# Patient Record
Sex: MALE | Race: WHITE | NOT HISPANIC OR LATINO | Employment: OTHER | URBAN - METROPOLITAN AREA
[De-identification: names, ages, dates, MRNs, and addresses within clinical notes are randomized per-mention and may not be internally consistent; named-entity substitution may affect disease eponyms.]

---

## 2023-06-05 ENCOUNTER — HOSPITAL ENCOUNTER (OUTPATIENT)
Dept: DATA CONVERSION | Facility: HOSPITAL | Age: 73
End: 2023-06-05
Attending: OPHTHALMOLOGY | Admitting: OPHTHALMOLOGY
Payer: MEDICARE

## 2023-06-05 DIAGNOSIS — H49.12 FOURTH (TROCHLEAR) NERVE PALSY, LEFT EYE: ICD-10-CM

## 2023-06-05 DIAGNOSIS — H50.22 VERTICAL STRABISMUS, LEFT EYE: ICD-10-CM

## 2023-06-05 DIAGNOSIS — I10 ESSENTIAL (PRIMARY) HYPERTENSION: ICD-10-CM

## 2023-06-05 DIAGNOSIS — E11.9 TYPE 2 DIABETES MELLITUS WITHOUT COMPLICATIONS (MULTI): ICD-10-CM

## 2023-06-05 DIAGNOSIS — G47.33 OBSTRUCTIVE SLEEP APNEA (ADULT) (PEDIATRIC): ICD-10-CM

## 2023-06-05 DIAGNOSIS — Z86.73 PERSONAL HISTORY OF TRANSIENT ISCHEMIC ATTACK (TIA), AND CEREBRAL INFARCTION WITHOUT RESIDUAL DEFICITS: ICD-10-CM

## 2023-06-05 LAB — POCT GLUCOSE: 123 MG/DL (ref 74–99)

## 2023-09-07 VITALS
RESPIRATION RATE: 16 BRPM | DIASTOLIC BLOOD PRESSURE: 72 MMHG | SYSTOLIC BLOOD PRESSURE: 141 MMHG | HEART RATE: 78 BPM | TEMPERATURE: 97.2 F

## 2023-09-28 PROBLEM — H90.A22 SENSORINEURAL HEARING LOSS (SNHL) OF LEFT EAR WITH RESTRICTED HEARING OF RIGHT EAR: Status: ACTIVE | Noted: 2023-09-28

## 2023-09-28 PROBLEM — H69.91 DYSFUNCTION OF RIGHT EUSTACHIAN TUBE: Status: ACTIVE | Noted: 2023-09-28

## 2023-09-28 PROBLEM — M26.629 TMJ SYNDROME: Status: ACTIVE | Noted: 2023-09-28

## 2023-09-28 PROBLEM — H49.12: Status: ACTIVE | Noted: 2023-09-28

## 2023-09-28 PROBLEM — H90.A31 MIXED CONDUCTIVE AND SENSORINEURAL HEARING LOSS OF RIGHT EAR WITH RESTRICTED HEARING OF LEFT EAR: Status: ACTIVE | Noted: 2023-09-28

## 2023-09-28 PROBLEM — H66.91 RIGHT OTITIS MEDIA: Status: ACTIVE | Noted: 2023-09-28

## 2023-09-28 RX ORDER — ATORVASTATIN CALCIUM 80 MG/1
80 TABLET, FILM COATED ORAL DAILY
COMMUNITY
Start: 2022-11-04

## 2023-09-28 RX ORDER — METFORMIN HYDROCHLORIDE 500 MG/1
1000 TABLET ORAL
COMMUNITY

## 2023-09-28 RX ORDER — BLOOD SUGAR DIAGNOSTIC
STRIP MISCELLANEOUS DAILY PRN
COMMUNITY
Start: 2023-01-19

## 2023-09-28 RX ORDER — GLIPIZIDE 5 MG/1
TABLET ORAL
COMMUNITY
End: 2024-01-30 | Stop reason: WASHOUT

## 2023-09-28 RX ORDER — LORAZEPAM 1 MG/1
1 TABLET ORAL 2 TIMES DAILY PRN
COMMUNITY
Start: 2023-06-01

## 2023-09-28 RX ORDER — BROMPHENIRAMINE MALEATE, PSEUDOEPHEDRINE HYDROCHLORIDE, AND DEXTROMETHORPHAN HYDROBROMIDE 2; 30; 10 MG/5ML; MG/5ML; MG/5ML
10 SYRUP ORAL
Status: ON HOLD | COMMUNITY
Start: 2022-10-28 | End: 2023-11-21 | Stop reason: ALTCHOICE

## 2023-09-28 RX ORDER — AMANTADINE HYDROCHLORIDE 100 MG/1
100 CAPSULE, GELATIN COATED ORAL DAILY
Status: ON HOLD | COMMUNITY
Start: 2022-10-31 | End: 2023-11-21 | Stop reason: ALTCHOICE

## 2023-09-28 RX ORDER — FLUOXETINE HYDROCHLORIDE 20 MG/1
CAPSULE ORAL
Status: ON HOLD | COMMUNITY
End: 2023-11-21 | Stop reason: ALTCHOICE

## 2023-09-28 RX ORDER — VENLAFAXINE HYDROCHLORIDE 37.5 MG/1
37.5 CAPSULE, EXTENDED RELEASE ORAL DAILY
Status: ON HOLD | COMMUNITY
Start: 2022-12-12 | End: 2023-11-21

## 2023-09-28 RX ORDER — CYCLOBENZAPRINE HCL 10 MG
TABLET ORAL 2 TIMES DAILY PRN
Status: ON HOLD | COMMUNITY
Start: 2022-10-01 | End: 2023-11-21 | Stop reason: ALTCHOICE

## 2023-09-28 RX ORDER — VENLAFAXINE HYDROCHLORIDE 75 MG/1
75 CAPSULE, EXTENDED RELEASE ORAL NIGHTLY
COMMUNITY
Start: 2023-04-07

## 2023-09-28 RX ORDER — DONEPEZIL HYDROCHLORIDE 5 MG/1
5 TABLET, FILM COATED ORAL DAILY
COMMUNITY
Start: 2023-04-24

## 2023-09-28 RX ORDER — OMEPRAZOLE 40 MG/1
40 CAPSULE, DELAYED RELEASE ORAL DAILY
COMMUNITY
Start: 2022-12-27

## 2023-09-28 RX ORDER — LISINOPRIL 10 MG/1
10 TABLET ORAL DAILY
COMMUNITY
Start: 2023-04-13

## 2023-09-28 RX ORDER — BUSPIRONE HYDROCHLORIDE 15 MG/1
15 TABLET ORAL 3 TIMES DAILY
COMMUNITY
Start: 2023-04-17

## 2023-09-28 RX ORDER — GLIMEPIRIDE 2 MG/1
2 TABLET ORAL
COMMUNITY
Start: 2023-03-03

## 2023-09-28 RX ORDER — GABAPENTIN 300 MG/1
600 CAPSULE ORAL 3 TIMES DAILY
COMMUNITY
Start: 2023-05-14

## 2023-09-28 RX ORDER — OFLOXACIN 3 MG/ML
5 SOLUTION AURICULAR (OTIC) 2 TIMES DAILY
Status: ON HOLD | COMMUNITY
Start: 2020-03-12 | End: 2023-11-21 | Stop reason: ALTCHOICE

## 2023-09-30 NOTE — H&P
History of Present Illness:   History Present Illness:  Reason for surgery: left hypertropia, left superior  oblique palsy   HPI:    This is a 73 year old M with left hypertropia here for eye muscle surgery in the left eye    Allergies:        Allergies:  ·  No Known Allergies :     Home Medication Review:   Home Medications Reviewed: yes     Impression/Procedure:   ·  Impression and Planned Procedure: eye muscle surgery in the left eye       ERAS (Enhanced Recovery After Surgery):  ·  ERAS Patient: no       Vital Signs:  Temperature C: 36.2 degrees C   Temperature F: 97.1 degrees F   Heart Rate: 78 beats per minute   Respiratory Rate: 16 breath per minute   Blood Pressure Systolic: 141 mm/Hg   Blood Pressure Diastolic: 72 mm/Hg     Physical Exam by System:    Constitutional: Well developed, awake/alert/oriented  x3, no distress, alert and cooperative   Eyes: left hypertropia   Respiratory/Thorax: as per anesthesia   Cardiovascular: as per anesthesia     Consent:   COVID-19 Consent:  ·  COVID-19 Risk Consent Surgeon has reviewed key risks related to the risk of eladio COVID-19 and if they contract COVID-19 what the risks are.     Attestation:   Note Completion:  I am a:  Resident/Fellow   Attending Attestation I saw and evaluated the patient.  I personally obtained the key and critical portions of the history and physical exam or was physically present for key and  critical portions performed by the resident/fellow. I reviewed the resident/fellow?s documentation and discussed the patient with the resident/fellow.  I agree with the resident/fellow?s medical decision making as documented in the note.     I personally evaluated the patient on 05-Jun-2023         Electronic Signatures:  Shane Cruz)  (Signed 05-Jun-2023 13:43)   Authored: History of Present Illness, Physical Exam,  Note Completion   Co-Signer: History of Present Illness, Allergies, Home Medication Review, Impression/Procedure, ERAS,  Physical Exam, Consent, Note Completion  Raul Higgins (Fellow))  (Signed 05-Jun-2023 11:58)   Authored: History of Present Illness, Allergies, Home  Medication Review, Impression/Procedure, ERAS, Physical Exam, Consent, Note Completion      Last Updated: 05-Jun-2023 13:43 by Shane Cruz)

## 2023-10-02 NOTE — OP NOTE
Post Operative Note:     PreOp Diagnosis: left hypertropia, left 4th NP   Post-Procedure Diagnosis: Same   Procedure: 1. left inferior oblique myectomy   Surgeon: Shane Cruz MD   Resident/Fellow/Other Assistant: Raul Higgins MD   Anesthesia: general   I.V. Fluids: 0   Estimated Blood Loss (mL): 0   Blood Replacement: 0   Specimen: no   Complications: none   Findings: normal anatomy and ductions     Operative Report Dictated:  Dictation: not applicable - note contains Operative  Report   Operative Report:    The patient was brought to the operating room and was placed in a supine position. After the patient was positively identified through a typical time-out procedure,  the patient received anesthesia and an LMA. Then the left eye was prepped and draped in the usual sterile ophthalmic fashion. Attention was then directed to the left eye in which an inferotemporal fornix conjunctival incision was performed. The lateral  rectus and inferior rectus muscles were identified and hooked. Using these hooks, the eye was turned superonasally. The inferior oblique was then identified and freed from the soft surrounding tissues without disrupting the orbital septum or the vortex  vein. The muscle was clamped at the level of the insertion to the globe and at the level of the intermuscular septum. The muscle was cauterized using handheld high temperature cautery. The muscle was then cut at the level of cauterization, and the clamps  were removed. The conjunctiva was then closed with 2 interrupted 8-0 vicryl sutures in a buried fashion. At the end, both eyes were cleaned. Tetracaine and 5% betadine eye solution and maxitrol drops were instilled in the eyes. The patient was then awakened  and the LMA was removed. The patient was transferred to the recovery room in good and stable condition. The patient tolerated the procedure and the anesthesia well.     Attestation:   Note Completion:  I am a: Resident/Fellow   Attending  Attestation I was present for the entire procedure          Electronic Signatures:  Shane Cruz (MD)  (Signed 05-Jun-2023 13:46)   Authored: Post Operative Note, Note Completion   Co-Signer: Post Operative Note, Note Completion  Raul Higgins (Fellow))  (Signed 05-Jun-2023 13:10)   Authored: Post Operative Note, Note Completion      Last Updated: 05-Jun-2023 13:46 by Shane Cruz)

## 2023-10-06 ENCOUNTER — APPOINTMENT (OUTPATIENT)
Dept: OPHTHALMOLOGY | Facility: CLINIC | Age: 73
End: 2023-10-06
Payer: MEDICARE

## 2023-11-21 ENCOUNTER — ANESTHESIA EVENT (OUTPATIENT)
Dept: OPERATING ROOM | Facility: CLINIC | Age: 73
End: 2023-11-21
Payer: MEDICARE

## 2023-11-21 ENCOUNTER — HOSPITAL ENCOUNTER (OUTPATIENT)
Facility: CLINIC | Age: 73
Setting detail: OUTPATIENT SURGERY
Discharge: HOME | End: 2023-11-21
Attending: OPHTHALMOLOGY | Admitting: OPHTHALMOLOGY
Payer: MEDICARE

## 2023-11-21 ENCOUNTER — ANESTHESIA (OUTPATIENT)
Dept: OPERATING ROOM | Facility: CLINIC | Age: 73
End: 2023-11-21
Payer: MEDICARE

## 2023-11-21 VITALS
OXYGEN SATURATION: 97 % | HEIGHT: 67 IN | DIASTOLIC BLOOD PRESSURE: 62 MMHG | BODY MASS INDEX: 30.03 KG/M2 | RESPIRATION RATE: 20 BRPM | SYSTOLIC BLOOD PRESSURE: 150 MMHG | HEART RATE: 75 BPM | TEMPERATURE: 97.3 F | WEIGHT: 191.36 LBS

## 2023-11-21 LAB — POC FINGERSTICK BLOOD GLUCOSE: 147 MG/DL (ref 70–100)

## 2023-11-21 PROCEDURE — 2500000001 HC RX 250 WO HCPCS SELF ADMINISTERED DRUGS (ALT 637 FOR MEDICARE OP): Performed by: OPHTHALMOLOGY

## 2023-11-21 PROCEDURE — 67314 REVISE EYE MUSCLE: CPT | Performed by: OPHTHALMOLOGY

## 2023-11-21 PROCEDURE — 3600000008 HC OR TIME - EACH INCREMENTAL 1 MINUTE - PROCEDURE LEVEL THREE: Performed by: OPHTHALMOLOGY

## 2023-11-21 PROCEDURE — 7100000009 HC PHASE TWO TIME - INITIAL BASE CHARGE: Performed by: OPHTHALMOLOGY

## 2023-11-21 PROCEDURE — 3700000002 HC GENERAL ANESTHESIA TIME - EACH INCREMENTAL 1 MINUTE: Performed by: OPHTHALMOLOGY

## 2023-11-21 PROCEDURE — 7100000010 HC PHASE TWO TIME - EACH INCREMENTAL 1 MINUTE: Performed by: OPHTHALMOLOGY

## 2023-11-21 PROCEDURE — A4217 STERILE WATER/SALINE, 500 ML: HCPCS | Performed by: OPHTHALMOLOGY

## 2023-11-21 PROCEDURE — 2500000004 HC RX 250 GENERAL PHARMACY W/ HCPCS (ALT 636 FOR OP/ED): Performed by: ANESTHESIOLOGIST ASSISTANT

## 2023-11-21 PROCEDURE — 7100000002 HC RECOVERY ROOM TIME - EACH INCREMENTAL 1 MINUTE: Performed by: OPHTHALMOLOGY

## 2023-11-21 PROCEDURE — A67311 PR STABISMUS SURG,ONE HORIZ MUSCLE: Performed by: ANESTHESIOLOGY

## 2023-11-21 PROCEDURE — 2500000004 HC RX 250 GENERAL PHARMACY W/ HCPCS (ALT 636 FOR OP/ED): Performed by: OPHTHALMOLOGY

## 2023-11-21 PROCEDURE — 2500000005 HC RX 250 GENERAL PHARMACY W/O HCPCS: Performed by: ANESTHESIOLOGIST ASSISTANT

## 2023-11-21 PROCEDURE — 3600000003 HC OR TIME - INITIAL BASE CHARGE - PROCEDURE LEVEL THREE: Performed by: OPHTHALMOLOGY

## 2023-11-21 PROCEDURE — 7100000001 HC RECOVERY ROOM TIME - INITIAL BASE CHARGE: Performed by: OPHTHALMOLOGY

## 2023-11-21 PROCEDURE — 3700000001 HC GENERAL ANESTHESIA TIME - INITIAL BASE CHARGE: Performed by: OPHTHALMOLOGY

## 2023-11-21 PROCEDURE — 82962 GLUCOSE BLOOD TEST: CPT | Performed by: OPHTHALMOLOGY

## 2023-11-21 PROCEDURE — A67311 PR STABISMUS SURG,ONE HORIZ MUSCLE: Performed by: ANESTHESIOLOGIST ASSISTANT

## 2023-11-21 PROCEDURE — 99100 ANES PT EXTEME AGE<1 YR&>70: CPT | Performed by: ANESTHESIOLOGY

## 2023-11-21 RX ORDER — TAMSULOSIN HYDROCHLORIDE 0.4 MG/1
0.4 CAPSULE ORAL DAILY
COMMUNITY

## 2023-11-21 RX ORDER — PHENYLEPHRINE HYDROCHLORIDE 25 MG/ML
SOLUTION/ DROPS OPHTHALMIC AS NEEDED
Status: DISCONTINUED | OUTPATIENT
Start: 2023-11-21 | End: 2023-11-21 | Stop reason: HOSPADM

## 2023-11-21 RX ORDER — PROPOFOL 10 MG/ML
INJECTION, EMULSION INTRAVENOUS CONTINUOUS PRN
Status: DISCONTINUED | OUTPATIENT
Start: 2023-11-21 | End: 2023-11-21

## 2023-11-21 RX ORDER — WATER 1 ML/ML
IRRIGANT IRRIGATION AS NEEDED
Status: DISCONTINUED | OUTPATIENT
Start: 2023-11-21 | End: 2023-11-21 | Stop reason: HOSPADM

## 2023-11-21 RX ORDER — TETRACAINE HYDROCHLORIDE 5 MG/ML
SOLUTION OPHTHALMIC AS NEEDED
Status: DISCONTINUED | OUTPATIENT
Start: 2023-11-21 | End: 2023-11-21 | Stop reason: HOSPADM

## 2023-11-21 RX ORDER — GLYCOPYRROLATE 0.2 MG/ML
INJECTION INTRAMUSCULAR; INTRAVENOUS AS NEEDED
Status: DISCONTINUED | OUTPATIENT
Start: 2023-11-21 | End: 2023-11-21

## 2023-11-21 RX ORDER — ONDANSETRON HYDROCHLORIDE 2 MG/ML
4 INJECTION, SOLUTION INTRAVENOUS ONCE AS NEEDED
Status: DISCONTINUED | OUTPATIENT
Start: 2023-11-21 | End: 2023-11-21 | Stop reason: HOSPADM

## 2023-11-21 RX ORDER — ACETAMINOPHEN 325 MG/1
TABLET ORAL AS NEEDED
Status: DISCONTINUED | OUTPATIENT
Start: 2023-11-21 | End: 2023-11-21

## 2023-11-21 RX ORDER — ONDANSETRON HYDROCHLORIDE 2 MG/ML
INJECTION, SOLUTION INTRAVENOUS AS NEEDED
Status: DISCONTINUED | OUTPATIENT
Start: 2023-11-21 | End: 2023-11-21

## 2023-11-21 RX ORDER — LIDOCAINE HYDROCHLORIDE 20 MG/ML
INJECTION, SOLUTION INFILTRATION; PERINEURAL AS NEEDED
Status: DISCONTINUED | OUTPATIENT
Start: 2023-11-21 | End: 2023-11-21

## 2023-11-21 RX ORDER — FENTANYL CITRATE 50 UG/ML
INJECTION, SOLUTION INTRAMUSCULAR; INTRAVENOUS AS NEEDED
Status: DISCONTINUED | OUTPATIENT
Start: 2023-11-21 | End: 2023-11-21

## 2023-11-21 RX ORDER — PROPOFOL 10 MG/ML
INJECTION, EMULSION INTRAVENOUS AS NEEDED
Status: DISCONTINUED | OUTPATIENT
Start: 2023-11-21 | End: 2023-11-21

## 2023-11-21 RX ORDER — KETOROLAC TROMETHAMINE 30 MG/ML
INJECTION, SOLUTION INTRAMUSCULAR; INTRAVENOUS AS NEEDED
Status: DISCONTINUED | OUTPATIENT
Start: 2023-11-21 | End: 2023-11-21

## 2023-11-21 RX ORDER — SODIUM CHLORIDE, SODIUM LACTATE, POTASSIUM CHLORIDE, CALCIUM CHLORIDE 600; 310; 30; 20 MG/100ML; MG/100ML; MG/100ML; MG/100ML
100 INJECTION, SOLUTION INTRAVENOUS CONTINUOUS
Status: DISCONTINUED | OUTPATIENT
Start: 2023-11-21 | End: 2023-11-21 | Stop reason: HOSPADM

## 2023-11-21 RX ORDER — MIDAZOLAM HYDROCHLORIDE 1 MG/ML
INJECTION, SOLUTION INTRAMUSCULAR; INTRAVENOUS AS NEEDED
Status: DISCONTINUED | OUTPATIENT
Start: 2023-11-21 | End: 2023-11-21

## 2023-11-21 RX ORDER — NEOMYCIN SULFATE, POLYMYXIN B SULFATE AND DEXAMETHASONE 3.5; 10000; 1 MG/ML; [USP'U]/ML; MG/ML
SUSPENSION/ DROPS OPHTHALMIC AS NEEDED
Status: DISCONTINUED | OUTPATIENT
Start: 2023-11-21 | End: 2023-11-21 | Stop reason: HOSPADM

## 2023-11-21 RX ORDER — POVIDONE-IODINE 5 %
SOLUTION, NON-ORAL OPHTHALMIC (EYE) AS NEEDED
Status: DISCONTINUED | OUTPATIENT
Start: 2023-11-21 | End: 2023-11-21 | Stop reason: HOSPADM

## 2023-11-21 RX ORDER — LIDOCAINE IN NACL,ISO-OSMOT/PF 30 MG/3 ML
0.1 SYRINGE (ML) INJECTION ONCE
Status: DISCONTINUED | OUTPATIENT
Start: 2023-11-21 | End: 2023-11-21 | Stop reason: HOSPADM

## 2023-11-21 RX ADMIN — LIDOCAINE HYDROCHLORIDE 100 MG: 20 INJECTION, SOLUTION INFILTRATION; PERINEURAL at 12:14

## 2023-11-21 RX ADMIN — MIDAZOLAM 2 MG: 1 INJECTION INTRAMUSCULAR; INTRAVENOUS at 12:07

## 2023-11-21 RX ADMIN — PROPOFOL 50 MCG/KG/MIN: 10 INJECTION, EMULSION INTRAVENOUS at 12:19

## 2023-11-21 RX ADMIN — ONDANSETRON 4 MG: 2 INJECTION INTRAMUSCULAR; INTRAVENOUS at 12:49

## 2023-11-21 RX ADMIN — PROPOFOL 150 MG: 10 INJECTION, EMULSION INTRAVENOUS at 12:14

## 2023-11-21 RX ADMIN — SODIUM CHLORIDE, SODIUM LACTATE, POTASSIUM CHLORIDE, AND CALCIUM CHLORIDE: .6; .31; .03; .02 INJECTION, SOLUTION INTRAVENOUS at 11:45

## 2023-11-21 RX ADMIN — GLYCOPYRROLATE 0.1 MG: 0.2 INJECTION INTRAMUSCULAR; INTRAVENOUS at 12:07

## 2023-11-21 RX ADMIN — FENTANYL CITRATE 100 MCG: 50 INJECTION, SOLUTION INTRAMUSCULAR; INTRAVENOUS at 12:14

## 2023-11-21 RX ADMIN — ACETAMINOPHEN 975 MG: 325 TABLET ORAL at 12:00

## 2023-11-21 RX ADMIN — KETOROLAC TROMETHAMINE 30 MG: 30 INJECTION, SOLUTION INTRAMUSCULAR at 12:49

## 2023-11-21 SDOH — HEALTH STABILITY: MENTAL HEALTH: CURRENT SMOKER: 0

## 2023-11-21 ASSESSMENT — PAIN SCALES - GENERAL
PAINLEVEL_OUTOF10: 0 - NO PAIN
PAINLEVEL_OUTOF10: 1
PAINLEVEL_OUTOF10: 0 - NO PAIN
PAINLEVEL_OUTOF10: 0 - NO PAIN

## 2023-11-21 ASSESSMENT — COLUMBIA-SUICIDE SEVERITY RATING SCALE - C-SSRS
1. IN THE PAST MONTH, HAVE YOU WISHED YOU WERE DEAD OR WISHED YOU COULD GO TO SLEEP AND NOT WAKE UP?: NO
2. HAVE YOU ACTUALLY HAD ANY THOUGHTS OF KILLING YOURSELF?: NO
6. HAVE YOU EVER DONE ANYTHING, STARTED TO DO ANYTHING, OR PREPARED TO DO ANYTHING TO END YOUR LIFE?: NO

## 2023-11-21 ASSESSMENT — PAIN - FUNCTIONAL ASSESSMENT
PAIN_FUNCTIONAL_ASSESSMENT: 0-10

## 2023-11-21 NOTE — DISCHARGE INSTRUCTIONS
Postoperative Discharge Instructions  Eye Muscle Surgery (Strabismus surgery)    General Information  Bloody tears may ooze out of the eyes for 1 - 2 days. A small amount of bleeding from the nose may occur. You may wipe the eye carefully with facial tissue.  Don't be concerned about the position of the eyes immediately after surgery. It can take several weeks for the eye position to stabilize.  Discomfort or slight pain with eye movements as well as mild eye lid swelling can be seen and it tends to get better with time.  In the morning, the eyelids may be stuck together for the first day following surgery. Gently pull the lids apart. If this is too difficult, then apply a warm moist wash cloth for a few minutes to the area and try again.  Hair can be washed any time following surgery taking extra care to keep eyes dry at all times.    Activity  Don't drive for 48 hours after surgery or if you are experiencing double vision.  The anesthetic can affect coordination and judgment for 48 hours.  The patient may resume normal activities, including watching television as tolerated. Sports, exercise and swimming should be avoided for ten (10) days.  No swimming or pooled water for 2 weeks.   Showering is okay.     Diet  Avoid alcoholic beverages for 48 hours.  A normal diet may be resumed after a liquid diet is tolerated without nausea. Advance diet slowly with only bland (BRAT diet - bananas, rice, applesauce, toast) foods the first day.    Medications  You may take Tylenol or prescribed medication for discomfort. Avoid Aspirin and/or aspirin-related products, such as Advil and Motrin.  If eye drops are given or prescribed, please use them as directed. Maxitrol eye drops 4 times daily in the right eye.   Resume Home Medications.  Follow-up Appointment  1. Follow-up appointment as directed.  After Hours, Weekends and Holidays call [phone] and ask for Pediatric Ophthalmologist.      Please call if you have any  questions or problems - day or night.  [phone] or [phone]  After hours call [phone]    Tylenol/ibuprofen as needed.     May have Tylenol after: 6:00PM    May have Ibuprofen/advil/motrin/aleve after: 7:00PM

## 2023-11-21 NOTE — ANESTHESIA PREPROCEDURE EVALUATION
Patient: Tom Jhaveri    Procedure Information       Anesthesia Start Date/Time: 11/21/23 1207    Procedure: RIGHT INFERIOR RECTUS RECESSION (Right)    [place] [facility] [place] 02 / [facility]    Surgeons: Shane Cruz MD            Relevant Problems   Neuro/Psych   (+) Fourth (trochlear) nerve palsy, left eye      Eyes, Ears, Nose, and Throat   (+) Mixed conductive and sensorineural hearing loss of right ear with restricted hearing of left ear   (+) Sensorineural hearing loss (SNHL) of left ear with restricted hearing of right ear       Clinical information reviewed:   Tobacco  Allergies  Meds   Med Hx  Surg Hx   Fam Hx  Soc Hx        NPO Detail:  NPO/Void Status  Date of Last Liquid: 11/21/23  Time of Last Liquid: 0730  Date of Last Solid: 11/20/23  Time of Last Solid: 2000  Last Intake Type: Clear fluids         Physical Exam    Airway  Mallampati: III     Cardiovascular - normal exam     Dental    Pulmonary    Abdominal      Other findings: Possible difficult airway          Anesthesia Plan    ASA 3     general     The patient is not a current smoker.    intravenous induction   Anesthetic plan and risks discussed with patient and spouse.    Plan discussed with CAA.

## 2023-11-21 NOTE — ANESTHESIA PROCEDURE NOTES
Airway  Date/Time: 11/21/2023 12:16 PM  Urgency: elective    Airway not difficult    Staffing  Performed: LAURA   Authorized by: Jose Souza MD    Performed by: LAURA Hernandez  Patient location during procedure: OR    Indications and Patient Condition  Indications for airway management: anesthesia  Spontaneous ventilation: present  Sedation level: deep  Preoxygenated: yes  Mask difficulty assessment: 0 - not attempted  Planned trial extubation    Final Airway Details  Final airway type: supraglottic airway      Successful airway: Size 4     Number of attempts at approach: 1  Number of other approaches attempted: 0    Additional Comments  Lips/teeth in pre-anesthetic condition.  IGEL LMA

## 2023-11-21 NOTE — OP NOTE
RIGHT INFERIOR RECTUS RECESSION (R) Operative Note     Date: 2023  OR Location: [facility]    Name: Tom Jhaveri, : 1950, Age: 73 y.o., MRN: [id number], Sex: male    Diagnosis  Pre-op Diagnosis     * Fourth (trochlear) nerve palsy, unspecified eye [H49.10] Post-op Diagnosis     * Fourth (trochlear) nerve palsy, unspecified eye [H49.10]     Procedures  RIGHT INFERIOR RECTUS RECESSION  02083 - AL STRABISMUS RECESSION/RESCJ 1 [id number] [place]  RI 5.5 mm    Surgeons      * Shane Cruz - Primary    Resident/Fellow/Other Assistant:  Surgeon(s) and Role:  Pau Holley    Procedure Summary  Anesthesia: General  ASA: ASA status not filed in the log.  Anesthesia Staff: Anesthesiologist: Jose Souza MD  C-AA: LAURA Hernandez  Estimated Blood Loss: 1 mL  Intra-op Medications: * No intraprocedure medications in log *           Anesthesia Record               Intraprocedure I/O Totals          Intake    .00 mL    Propofol Drip 0.00 mL    The total shown is the total volume documented since Anesthesia Start was filed.    Total Intake 200 mL          Specimen: No specimens collected     Staff:   Circulator: Brandin Jauregui RN  Relief Circulator: Sanjuana Todd RN  Relief Scrub: Dottie Espinal RN  Scrub Person: Julienne Singer         Drains and/or Catheters: * None in log *    Tourniquet Times:         Implants:     Findings: Normal anatomy and ductions    Indications: Tom Jhaveri is an 73 y.o. male who is having surgery for Fourth (trochlear) nerve palsy, unspecified eye [H49.10].     The patient was seen in the preoperative area. The risks, benefits, complications, treatment options, non-operative alternatives, expected recovery and outcomes were discussed with the patient. The possibilities of reaction to medication, pulmonary aspiration, injury to surrounding structures, bleeding, recurrent infection, the need for additional procedures, failure to diagnose a  condition, and creating a complication requiring transfusion or operation were discussed with the patient. The patient concurred with the proposed plan, giving informed consent.  The site of surgery was properly noted/marked if necessary per policy. The patient has been actively warmed in preoperative area. Preoperative antibiotics are not indicated. Venous thrombosis prophylaxis are not indicated.    Procedure Details: The patient was brought to the operating room and was placed in a supine position.   After the patient was positively identified through a typical time-out procedure, the patient received anesthesia and an LMA.   Then the right eye was prepped and draped in the usual sterile ophthalmic fashion.   Attention was then directed to the right eye in which an inferior limbal conjunctival incision was performed. Then the inferior rectus was identified and freed from the soft surrounding tissues. The lower lid retractors were carefully dissected as well.  The muscle was secured with a locking bite at the center 1 mm away from the original insertion using a 6-0 polysorb suture. The suture was weaved nasally and temporally with a locking bite at both borders. The muscle was disinserted from the globe and reinserted back 5.5 mm away from the original insertion. The conjunctiva was then closed with 4 interrupted 8-0 polysorb sutures in a buried fashion.       At the end, the eye was cleaned. Tetracaine and 5% betadine eye solution were instilled in the eye. Maxitrol drops were also instilled in the eye.   The patient was then awakened and the LMA was removed.   The patient was transferred to the recover room in good and stable condition.   The patient tolerated the procedure and the anesthesia well.    Complications:  None; patient tolerated the procedure well.    Disposition: PACU - hemodynamically stable.  Condition: stable         Additional Details: none    Attending Attestation: I was present and scrubbed  for the entire procedure.    Shane Cruz  Phone Number: [phone]

## 2023-11-21 NOTE — ANESTHESIA POSTPROCEDURE EVALUATION
Patient: Tom Jhaveri    Procedure Summary       Date: 11/21/23 Room / Location: [facility] OR 02 / [facility] [place] [facility]    Anesthesia Start: 1207 Anesthesia Stop: 1308    Procedure: RIGHT INFERIOR RECTUS RECESSION (Right) Diagnosis:       Fourth (trochlear) nerve palsy, unspecified eye      (Fourth (trochlear) nerve palsy, unspecified eye [H49.10])    Surgeons: Shane Cruz MD Responsible Provider: Jose Souza MD    Anesthesia Type: general ASA Status: 3            Anesthesia Type: general    Vitals Value Taken Time   /66 11/21/23 1335   Temp 36.4 °C (97.5 °F) 11/21/23 1308   Pulse 74 11/21/23 1335   Resp 16 11/21/23 1335   SpO2 96 % 11/21/23 1335       Anesthesia Post Evaluation    Patient participation: complete - patient participated  Level of consciousness: awake  Pain management: adequate  Airway patency: patent  Cardiovascular status: acceptable  Respiratory status: acceptable  Hydration status: acceptable  Postoperative Nausea and Vomiting: none  Comments: Did well        There were no known notable events for this encounter.

## 2023-11-21 NOTE — H&P
History Of Present Illness  Tom Jhaveri is a 73 y.o. male with left eye hypertropia/right eye hypotropia. Presenting for planned strabismus surgery, right eye.      Past Medical History  Past Medical History:   Diagnosis Date    Diabetes mellitus (CMS/HCC)     Hypertension     Personal history of other diseases of male genital organs     History of prostate disorder    Personal history of other endocrine, nutritional and metabolic disease     History of diabetes mellitus    Sleep apnea     CPAP       Surgical History  Past Surgical History:   Procedure Laterality Date    OTHER SURGICAL HISTORY  03/12/2020    Gallbladder surgery    OTHER SURGICAL HISTORY  03/12/2020    Vasectomy    STRABISMUS SURGERY          Social History  He has no history on file for tobacco use, alcohol use, and drug use.    Family History  Family History   Problem Relation Name Age of Onset    Glaucoma Mother      Other (CARDIAC DISORDER) Other      Other (DEAFNESS OR HEARING LOSS) Other      Hypertension Other          Allergies  Patient has no known allergies.    Review of Systems   All other systems reviewed and are negative.         Physical Exam  Constitutional: No acute distress   HEENT: mucus membranes moist, atraumatic   Respiratory: no respiratory distress   Cardiovascular: no peripheral edema  Abdomen: non distended   MSK/neuro: moves all extremities spontaneously   Skin: no rashes   Psych: alert and oriented        Last Recorded Vitals  There were no vitals taken for this visit.    Relevant Results           Assessment/Plan   Active Problems:  There are no active Hospital Problems.    Tom Jhaveri is a 73 y.o. male with left eye hypertropia/right eye hypotropia. Presenting for planned strabismus surgery, right eye.            Junior Holley MD

## 2023-11-24 ENCOUNTER — OFFICE VISIT (OUTPATIENT)
Dept: OPHTHALMOLOGY | Facility: CLINIC | Age: 73
End: 2023-11-24
Payer: MEDICARE

## 2023-11-24 DIAGNOSIS — H49.12 FOURTH (TROCHLEAR) NERVE PALSY, LEFT EYE: ICD-10-CM

## 2023-11-24 DIAGNOSIS — H50.21 HYPOTROPIA OF RIGHT EYE: Primary | ICD-10-CM

## 2023-11-24 PROCEDURE — 99024 POSTOP FOLLOW-UP VISIT: CPT | Performed by: OPHTHALMOLOGY

## 2023-11-24 ASSESSMENT — ENCOUNTER SYMPTOMS
CARDIOVASCULAR NEGATIVE: 0
RESPIRATORY NEGATIVE: 0
GASTROINTESTINAL NEGATIVE: 0
CONSTITUTIONAL NEGATIVE: 0
ENDOCRINE NEGATIVE: 0
MUSCULOSKELETAL NEGATIVE: 0
PSYCHIATRIC NEGATIVE: 0
EYES NEGATIVE: 1
HEMATOLOGIC/LYMPHATIC NEGATIVE: 0
NEUROLOGICAL NEGATIVE: 0
ALLERGIC/IMMUNOLOGIC NEGATIVE: 0

## 2023-11-24 ASSESSMENT — VISUAL ACUITY
METHOD: SNELLEN - LINEAR
OD_SC: 20/25
OS_SC: 20/20-2

## 2023-11-24 ASSESSMENT — SLIT LAMP EXAM - LIDS
COMMENTS: NORMAL
COMMENTS: NORMAL

## 2023-11-24 ASSESSMENT — EXTERNAL EXAM - RIGHT EYE: OD_EXAM: NORMAL

## 2023-11-24 ASSESSMENT — EXTERNAL EXAM - LEFT EYE: OS_EXAM: NORMAL

## 2023-11-24 NOTE — PROGRESS NOTES
Pt status post (s/p) right inferior rectus (RIR) recession healing well. We will cont drops as planned and f/u in 2 months. We will check the glasses at that time.

## 2023-12-14 ENCOUNTER — OFFICE VISIT (OUTPATIENT)
Dept: OPHTHALMOLOGY | Facility: CLINIC | Age: 73
End: 2023-12-14
Payer: MEDICARE

## 2023-12-14 DIAGNOSIS — Z98.890 HISTORY OF STRABISMUS SURGERY: ICD-10-CM

## 2023-12-14 DIAGNOSIS — H50.21 HYPOTROPIA OF RIGHT EYE: Primary | ICD-10-CM

## 2023-12-14 DIAGNOSIS — H49.12 FOURTH (TROCHLEAR) NERVE PALSY, LEFT EYE: ICD-10-CM

## 2023-12-14 PROCEDURE — 99024 POSTOP FOLLOW-UP VISIT: CPT | Performed by: OPHTHALMOLOGY

## 2023-12-14 ASSESSMENT — SLIT LAMP EXAM - LIDS
COMMENTS: NORMAL
COMMENTS: NORMAL

## 2023-12-14 ASSESSMENT — VISUAL ACUITY
METHOD: SNELLEN - LINEAR
OS_SC: 20/25-2+1
OD_SC: 20/25+2

## 2023-12-14 ASSESSMENT — ENCOUNTER SYMPTOMS
GASTROINTESTINAL NEGATIVE: 0
HEMATOLOGIC/LYMPHATIC NEGATIVE: 0
ENDOCRINE NEGATIVE: 0
PSYCHIATRIC NEGATIVE: 0
ALLERGIC/IMMUNOLOGIC NEGATIVE: 0
CONSTITUTIONAL NEGATIVE: 0
NEUROLOGICAL NEGATIVE: 0
MUSCULOSKELETAL NEGATIVE: 0
CARDIOVASCULAR NEGATIVE: 0
RESPIRATORY NEGATIVE: 0
EYES NEGATIVE: 1

## 2023-12-14 ASSESSMENT — EXTERNAL EXAM - RIGHT EYE: OD_EXAM: NORMAL

## 2023-12-14 ASSESSMENT — EXTERNAL EXAM - LEFT EYE: OS_EXAM: NORMAL

## 2023-12-14 NOTE — PROGRESS NOTES
Tom is a 73 y.o. here for;    1. Hypotropia of right eye        2. Fourth (trochlear) nerve palsy, left eye        3. History of strabismus surgery          Today patient with limbal suture Right eye which is in contact with cornea. It was taken out under the slit lamp without complications.  Otherwise healing well with no signs of infection  Plan to follow-up in 6  weeks with Dr Cruz as planned  sooner prn.

## 2024-01-30 ENCOUNTER — OFFICE VISIT (OUTPATIENT)
Dept: OPHTHALMOLOGY | Facility: CLINIC | Age: 74
End: 2024-01-30
Payer: MEDICARE

## 2024-01-30 DIAGNOSIS — H49.12 FOURTH (TROCHLEAR) NERVE PALSY, LEFT EYE: Primary | ICD-10-CM

## 2024-01-30 DIAGNOSIS — Z98.890 HISTORY OF STRABISMUS SURGERY: ICD-10-CM

## 2024-01-30 PROCEDURE — 92015 DETERMINE REFRACTIVE STATE: CPT | Performed by: OPHTHALMOLOGY

## 2024-01-30 PROCEDURE — 99024 POSTOP FOLLOW-UP VISIT: CPT | Performed by: OPHTHALMOLOGY

## 2024-01-30 ASSESSMENT — VISUAL ACUITY
METHOD: SNELLEN - LINEAR
OD_SC: 20/30
OS_SC: 20/25
OS_SC+: -1

## 2024-01-30 ASSESSMENT — CONF VISUAL FIELD
OS_INFERIOR_NASAL_RESTRICTION: 0
METHOD: COUNTING FINGERS
OS_INFERIOR_TEMPORAL_RESTRICTION: 0
OD_SUPERIOR_TEMPORAL_RESTRICTION: 0
OD_INFERIOR_TEMPORAL_RESTRICTION: 0
OD_INFERIOR_NASAL_RESTRICTION: 0
OS_SUPERIOR_TEMPORAL_RESTRICTION: 0
OS_SUPERIOR_NASAL_RESTRICTION: 0
OD_NORMAL: 1
OS_NORMAL: 1
OD_SUPERIOR_NASAL_RESTRICTION: 0

## 2024-01-30 ASSESSMENT — ENCOUNTER SYMPTOMS
EYES NEGATIVE: 0
RESPIRATORY NEGATIVE: 0
GASTROINTESTINAL NEGATIVE: 0
MUSCULOSKELETAL NEGATIVE: 0
ALLERGIC/IMMUNOLOGIC NEGATIVE: 0
CONSTITUTIONAL NEGATIVE: 0
PSYCHIATRIC NEGATIVE: 0
CARDIOVASCULAR NEGATIVE: 0
HEMATOLOGIC/LYMPHATIC NEGATIVE: 0
ENDOCRINE NEGATIVE: 0
NEUROLOGICAL NEGATIVE: 0

## 2024-01-30 ASSESSMENT — REFRACTION_MANIFEST
OS_VPRISM: 1BD
OD_CYLINDER: +0.50
OD_ADD: +3.00
OS_AXIS: 100
OD_AXIS: 100
OS_SPHERE: -1.25
OD_VPRISM: 2 BU
OS_CYLINDER: +1.00
OS_ADD: +3.00
OD_SPHERE: -1.00

## 2024-01-30 ASSESSMENT — EXTERNAL EXAM - RIGHT EYE: OD_EXAM: NORMAL

## 2024-01-30 ASSESSMENT — EXTERNAL EXAM - LEFT EYE: OS_EXAM: NORMAL

## 2024-01-30 ASSESSMENT — SLIT LAMP EXAM - LIDS
COMMENTS: NORMAL
COMMENTS: NORMAL

## 2024-01-30 ASSESSMENT — REFRACTION_FINALRX
OD_VPRISM: 2 BU
OS_VPRISM: 1BD

## 2024-01-30 NOTE — PROGRESS NOTES
Pt status post (s/p) right inferior rectus (RIR), with residual LH(T). We will give new Rx with glasses. F/u in 6 months sooner prn.

## 2024-07-30 ENCOUNTER — APPOINTMENT (OUTPATIENT)
Dept: OPHTHALMOLOGY | Facility: CLINIC | Age: 74
End: 2024-07-30
Payer: MEDICARE

## 2024-08-27 ENCOUNTER — APPOINTMENT (OUTPATIENT)
Dept: OPHTHALMOLOGY | Facility: CLINIC | Age: 74
End: 2024-08-27
Payer: MEDICARE

## 2024-08-27 DIAGNOSIS — Z98.890 HISTORY OF STRABISMUS SURGERY: ICD-10-CM

## 2024-08-27 DIAGNOSIS — H50.21 HYPOTROPIA OF RIGHT EYE: ICD-10-CM

## 2024-08-27 DIAGNOSIS — H50.00 ESOTROPIA: ICD-10-CM

## 2024-08-27 DIAGNOSIS — H49.12 FOURTH (TROCHLEAR) NERVE PALSY, LEFT EYE: Primary | ICD-10-CM

## 2024-08-27 PROCEDURE — 92060 SENSORIMOTOR EXAMINATION: CPT | Performed by: OPHTHALMOLOGY

## 2024-08-27 PROCEDURE — 99213 OFFICE O/P EST LOW 20 MIN: CPT | Performed by: OPHTHALMOLOGY

## 2024-08-27 ASSESSMENT — REFRACTION_WEARINGRX
OD_CYLINDER: +0.50
OS_ADD: +3.00
OS_SPHERE: -1.25
SPECS_TYPE: PAL
OD_ADD: +3.00
OD_SPHERE: -1.00
OS_VPRISM: 1 BD
OD_VPRISM: 2 BU
OD_AXIS: 125
OS_AXIS: 094
OS_CYLINDER: +1.00

## 2024-08-27 ASSESSMENT — ENCOUNTER SYMPTOMS
NEUROLOGICAL NEGATIVE: 0
ENDOCRINE COMMENTS: DM
EYES NEGATIVE: 0
GASTROINTESTINAL NEGATIVE: 0
PSYCHIATRIC NEGATIVE: 0
ENDOCRINE NEGATIVE: 1
RESPIRATORY NEGATIVE: 0
HEMATOLOGIC/LYMPHATIC NEGATIVE: 0
CARDIOVASCULAR NEGATIVE: 0
MUSCULOSKELETAL NEGATIVE: 0
CONSTITUTIONAL NEGATIVE: 0
ALLERGIC/IMMUNOLOGIC NEGATIVE: 0

## 2024-08-27 ASSESSMENT — VISUAL ACUITY
CORRECTION_TYPE: GLASSES
OS_CC: 20/25
METHOD: SNELLEN - LINEAR
OS_CC+: +2
OD_CC: 20/20
OD_CC+: -1

## 2024-08-27 ASSESSMENT — CONF VISUAL FIELD
OS_NORMAL: 1
OD_SUPERIOR_TEMPORAL_RESTRICTION: 0
OS_INFERIOR_TEMPORAL_RESTRICTION: 0
OD_INFERIOR_NASAL_RESTRICTION: 0
METHOD: COUNTING FINGERS
OS_SUPERIOR_TEMPORAL_RESTRICTION: 0
OS_INFERIOR_NASAL_RESTRICTION: 0
OD_NORMAL: 1
OD_INFERIOR_TEMPORAL_RESTRICTION: 0
OD_SUPERIOR_NASAL_RESTRICTION: 0
OS_SUPERIOR_NASAL_RESTRICTION: 0

## 2024-08-27 ASSESSMENT — SLIT LAMP EXAM - LIDS
COMMENTS: NORMAL
COMMENTS: NORMAL

## 2024-08-27 ASSESSMENT — EXTERNAL EXAM - RIGHT EYE: OD_EXAM: NORMAL

## 2024-08-27 ASSESSMENT — EXTERNAL EXAM - LEFT EYE: OS_EXAM: NORMAL

## 2024-08-27 NOTE — PROGRESS NOTES
74 y.o. male with hx cranial nerve (CN) IV palsy status post (s/p) LIOM 6/5/23 and RIRc 5.5 mm 11/21/23.    Patient is doing excellent today; excellent alignment    Can follow up PRN; otherwise routine follow up with eye doctor

## (undated) DEVICE — CORD, ELECTROSURGICAL, BIPOLAR

## (undated) DEVICE — DRESSING, TRANSPARENT, TEGADERM, 2-3/8 X 2-3/4 IN

## (undated) DEVICE — SYRINGE, MONOJECT, LUER LOCK, 3 CC, LF

## (undated) DEVICE — SUTURE, VICRYL, 8-0, 12 IN, TG1406, DA, VIOLET

## (undated) DEVICE — SUTURE, CTD, VICRYL, 6-0, TG1008

## (undated) DEVICE — GLOVE, SURGICAL, ENCORE, MICROPTIC, 7.5, PF, LATEX, BROWN

## (undated) DEVICE — DRESSING, GAUZE, PAD, 4 X 4 IN, STERILE

## (undated) DEVICE — CLEANER, WIPE, INSTRUMENT, 3.25 X 3.25 IN

## (undated) DEVICE — APPLICATOR, COTTON TIP, 3 IN, WOOD, STERILE

## (undated) DEVICE — Device

## (undated) DEVICE — REST, HEAD, BAGEL, 9 IN

## (undated) DEVICE — SOLUTION, BSS IRRIGATING 15ML

## (undated) DEVICE — SPONGE, GAUZE, XRAY DECT, 16 PLY, 4 X 4, W/MASTER DMT,STERILE